# Patient Record
Sex: MALE | Race: BLACK OR AFRICAN AMERICAN | HISPANIC OR LATINO
[De-identification: names, ages, dates, MRNs, and addresses within clinical notes are randomized per-mention and may not be internally consistent; named-entity substitution may affect disease eponyms.]

---

## 2021-02-18 PROBLEM — Z00.00 ENCOUNTER FOR PREVENTIVE HEALTH EXAMINATION: Status: ACTIVE | Noted: 2021-02-18

## 2021-02-19 ENCOUNTER — APPOINTMENT (OUTPATIENT)
Dept: OTOLARYNGOLOGY | Facility: CLINIC | Age: 21
End: 2021-02-19
Payer: COMMERCIAL

## 2021-02-19 VITALS
TEMPERATURE: 76 F | HEART RATE: 70 BPM | BODY MASS INDEX: 30.8 KG/M2 | SYSTOLIC BLOOD PRESSURE: 134 MMHG | DIASTOLIC BLOOD PRESSURE: 77 MMHG | HEIGHT: 74 IN | WEIGHT: 240 LBS

## 2021-02-19 DIAGNOSIS — L08.9 LOCAL INFECTION OF THE SKIN AND SUBCUTANEOUS TISSUE, UNSPECIFIED: ICD-10-CM

## 2021-02-19 DIAGNOSIS — Z78.9 OTHER SPECIFIED HEALTH STATUS: ICD-10-CM

## 2021-02-19 DIAGNOSIS — Z87.2 PERSONAL HISTORY OF DISEASES OF THE SKIN AND SUBCUTANEOUS TISSUE: ICD-10-CM

## 2021-02-19 DIAGNOSIS — H60.8X3 OTHER OTITIS EXTERNA, BILATERAL: ICD-10-CM

## 2021-02-19 DIAGNOSIS — Z87.891 PERSONAL HISTORY OF NICOTINE DEPENDENCE: ICD-10-CM

## 2021-02-19 DIAGNOSIS — Z87.09 PERSONAL HISTORY OF OTHER DISEASES OF THE RESPIRATORY SYSTEM: ICD-10-CM

## 2021-02-19 DIAGNOSIS — F45.8 OTHER SOMATOFORM DISORDERS: ICD-10-CM

## 2021-02-19 DIAGNOSIS — H61.23 IMPACTED CERUMEN, BILATERAL: ICD-10-CM

## 2021-02-19 DIAGNOSIS — Z91.89 OTHER SPECIFIED PERSONAL RISK FACTORS, NOT ELSEWHERE CLASSIFIED: ICD-10-CM

## 2021-02-19 DIAGNOSIS — Z91.010 ALLERGY TO PEANUTS: ICD-10-CM

## 2021-02-19 PROCEDURE — 92557 COMPREHENSIVE HEARING TEST: CPT

## 2021-02-19 PROCEDURE — 92550 TYMPANOMETRY & REFLEX THRESH: CPT

## 2021-02-19 PROCEDURE — 99072 ADDL SUPL MATRL&STAF TM PHE: CPT

## 2021-02-19 PROCEDURE — 99204 OFFICE O/P NEW MOD 45 MIN: CPT

## 2021-02-19 RX ORDER — MOMETASONE FUROATE 1 MG/ML
0.1 SOLUTION TOPICAL
Qty: 1 | Refills: 1 | Status: ACTIVE | COMMUNITY
Start: 2021-02-19 | End: 1900-01-01

## 2021-02-19 RX ORDER — ALBUTEROL 90 MCG
90 AEROSOL (GRAM) INHALATION
Refills: 0 | Status: ACTIVE | COMMUNITY

## 2021-02-26 PROBLEM — Z91.010 HISTORY OF PEANUT ALLERGY: Status: RESOLVED | Noted: 2021-02-26 | Resolved: 2021-02-26

## 2021-02-26 PROBLEM — Z87.891 FORMER SMOKER: Status: ACTIVE | Noted: 2021-02-19

## 2021-02-26 PROBLEM — Z87.2 HISTORY OF ECZEMA: Status: RESOLVED | Noted: 2021-02-26 | Resolved: 2021-02-26

## 2021-02-26 PROBLEM — L08.9 TOE INFECTION: Status: ACTIVE | Noted: 2021-02-26

## 2021-02-26 PROBLEM — F45.8 BRUXISM: Status: ACTIVE | Noted: 2021-02-26

## 2021-02-26 NOTE — HISTORY OF PRESENT ILLNESS
[de-identified] : Mr. Hicks is a 20 year old man who presents for bilateral ear irritation.\par PCP is Shannon Tinsley MD.\par \par For over 1 year he feels like something is in both ears. Left ear is worse than right. \par Ear pain comes and goes, some days worse than others. Left ear gets more pain, then gets dry and he removes dead skin.\par Ears feel clogged with wax.\par A few weeks ago he had a pimple in his ear and tried to pop it --> it started bleeding.\par Feels like hearing is decreased in the left ear.\par Ringing in both ears for the past year.\par Wears headphones and listens to loud music.\par Previously did construction.\par Used to grind his teeth. He has heard his jaw click. Chews gum.\par Getting a root canal on Monday, 2/22.\par \par On antibiotics for his toe, after he dropped a keg on it a year ago.\par Smoke cigarettes since from 13 y/o until 7 months ago. 6-10 cigs a day. Marijuana use every day.\par History of asthma.  Gets eczema occasionally.\par

## 2021-02-26 NOTE — PHYSICAL EXAM
[Midline] : trachea located in midline position [Normal] : palatine tonsils are normal [] : septum deviated bilaterally [FreeTextEntry1] : No hoarseness. [de-identified] : TMJ click bilateral , nontender. [de-identified] : BILATERAL EACs: scant cerumen with lot of dry flaky skin was removed with curette.  Left EAC meatus with healing furuncle inferior wall. (residual dot erythema). [de-identified] : Occlusal surfaces molar teeth slightly flatteneded. [de-identified] : 2+ bilateral  [de-identified] : Carotid pulses 2+ bilateral.

## 2021-02-26 NOTE — DATA REVIEWED
[de-identified] : \par AUDIOGRAM (02/19/2021)\par RIGHT:  Hearing within normal limits.  \par LEFT:   Hearing within normal limits. \par Slight asymmetry at 8K Hz, worse on left side.  \par Tympanograms  A  bilateral   \par Word recognition 100%  bilateral    \par

## 2021-02-26 NOTE — CONSULT LETTER
[Dear  ___] : Dear  [unfilled], [Courtesy Letter:] : I had the pleasure of seeing your patient, [unfilled], in my office today. [Please see my note below.] : Please see my note below. [Sincerely,] : Sincerely, [FreeTextEntry2] : Shannon Tinsley MD\par [address]\par [place] [id number]\par  [FreeTextEntry3] : \par Prisca Sheppard MD \par Otolaryngology, Head and Neck Surgery \par \par

## 2021-02-26 NOTE — ASSESSMENT
[FreeTextEntry1] : Mr. Rinaldi evaluated for the following issues today:\par \par 1.) Bilateral ear irritation due to dry skin and chronic eczematous otitis externa.\par --> steroid cream to ear canal skin PRN itching\par \par 2.) Left ear pain seems more c/w TMJ arthralgia.  He has known hx of bruxism and chews gum often.\par -->  recommended.  To see dentist next week for root canal and discuss at that time\par --> TMJ precautions, like stop gum chewing.\par \par 3.) Hearing difficulty\par Audiogram today shows hearing is within normal limits except for mild asymmetricy at 8K Hz, worse on left side.\par \par 4.) Healed furuncle in left EAC meatus.\par \par Return 3 months to recheck hearing.\par \par

## 2021-05-19 ENCOUNTER — APPOINTMENT (OUTPATIENT)
Dept: OTOLARYNGOLOGY | Facility: CLINIC | Age: 21
End: 2021-05-19
Payer: COMMERCIAL

## 2021-05-19 VITALS
SYSTOLIC BLOOD PRESSURE: 129 MMHG | WEIGHT: 240 LBS | DIASTOLIC BLOOD PRESSURE: 72 MMHG | TEMPERATURE: 96.7 F | HEART RATE: 89 BPM | BODY MASS INDEX: 30.8 KG/M2 | HEIGHT: 74 IN

## 2021-05-19 DIAGNOSIS — M26.609 UNSPECIFIED TEMPOROMANDIBULAR JOINT DISORDER: ICD-10-CM

## 2021-05-19 DIAGNOSIS — H92.02 OTALGIA, LEFT EAR: ICD-10-CM

## 2021-05-19 DIAGNOSIS — H93.8X3 OTHER SPECIFIED DISORDERS OF EAR, BILATERAL: ICD-10-CM

## 2021-05-19 DIAGNOSIS — H60.00 ABSCESS OF EXTERNAL EAR, UNSPECIFIED EAR: ICD-10-CM

## 2021-05-19 DIAGNOSIS — H91.90 UNSPECIFIED HEARING LOSS, UNSPECIFIED EAR: ICD-10-CM

## 2021-05-19 PROCEDURE — 92550 TYMPANOMETRY & REFLEX THRESH: CPT

## 2021-05-19 PROCEDURE — 99213 OFFICE O/P EST LOW 20 MIN: CPT

## 2021-05-19 PROCEDURE — 92557 COMPREHENSIVE HEARING TEST: CPT

## 2021-05-19 RX ORDER — MOMETASONE FUROATE 1 MG/ML
0.1 SOLUTION TOPICAL
Qty: 1 | Refills: 2 | Status: ACTIVE | COMMUNITY
Start: 2021-05-19 | End: 1900-01-01

## 2021-06-07 PROBLEM — H92.02 LEFT EAR PAIN: Status: ACTIVE | Noted: 2021-02-19

## 2021-06-07 PROBLEM — H60.00 FURUNCLE OF EAR: Status: RESOLVED | Noted: 2021-02-19 | Resolved: 2021-06-07

## 2021-06-07 PROBLEM — M26.609 TMJ (TEMPOROMANDIBULAR JOINT DISORDER): Status: ACTIVE | Noted: 2021-02-19

## 2021-06-07 PROBLEM — H93.8X3 SENSATION OF PLUGGED EAR ON BOTH SIDES: Status: RESOLVED | Noted: 2021-02-19 | Resolved: 2021-06-07

## 2021-06-07 PROBLEM — H91.90 DIFFICULTY HEARING: Status: ACTIVE | Noted: 2021-02-26

## 2021-06-07 NOTE — PHYSICAL EXAM
[de-identified] : TMJ click bilateral , nontender. [Midline] : trachea located in midline position [de-identified] : teeth slightly flat [de-identified] : 2+ [Normal] : no neck adenopathy

## 2021-06-07 NOTE — ASSESSMENT
[FreeTextEntry1] : Mr. Rinaldi evaluated for the following issues today:\par \par 1.) Audiogram was repeated today shows hearing is within normal limits and symmetric bilaterally.\par I reassured him that hearing is within normal limits.  If continues to perceive a problem, then he may benefit from assessment of central auditory processing (CAP) testing.\par \par 2.) Bilateral chronic eczematous otitis externa controlled with PRN steroid cream to skin\par \par 3.) Left ear pain due to TMJ arthralgia.  Hx of bruxism\par -->  still recommended.  discuss with dentist at next visit\par --> continue TMJ precautions\par \par I would be happy to see him again as needed. \par \par

## 2021-06-07 NOTE — CONSULT LETTER
[Dear  ___] : Dear  [unfilled], [Courtesy Letter:] : I had the pleasure of seeing your patient, [unfilled], in my office today. [Please see my note below.] : Please see my note below. [Sincerely,] : Sincerely, [___] : [unfilled] [FreeTextEntry2] : Shannon Tinsley MD\par [address]\par [place] [id number]\par  [FreeTextEntry3] : \par Prisca Sheppard MD \par Otolaryngology, Head and Neck Surgery \par \par

## 2021-06-07 NOTE — DATA REVIEWED
[de-identified] : \par AUDIOGRAM (05/19/2021)\par RIGHT:  Hearing within normal limits.  \par LEFT:   Hearing within normal limits. \par No asymmetry noted today.\par Tympanograms  A  bilateral   \par Word recognition 100%  bilateral    \par \par AUDIOGRAM (02/19/2021)\par RIGHT:  Hearing within normal limits.  \par LEFT:   Hearing within normal limits. \par Slight asymmetry at 8K Hz, worse on left side.  \par Tympanograms  A  bilateral   \par Word recognition 100%  bilateral    \par

## 2021-06-07 NOTE — HISTORY OF PRESENT ILLNESS
[de-identified] : Mr. Hicks is seen in f/up for hearing evaluation\par PCP is Shannon Tinsley MD.\par \par On audiogram from Feb 2021, hearing was within normal limits except for mild asymmetry at 8K Hz, worse on left.\par No ear itching\par Still gets left ear pain at times.  Diagnosed last visit w/ TMJ arthralgia.  Saw dentist but no nightguard made.\par \par \par INITIAL VISIT 02/19/2021:\par For over 1 year he feels like something is in both ears. Left ear is worse than right. \par Ear pain comes and goes, some days worse than others. Left ear gets more pain, then gets dry and he removes dead skin.  Ears feel clogged with wax.  Feels like hearing is decreased in the left ear.\par A few weeks ago he had a pimple in his ear and tried to pop it --> it started bleeding.\par Ringing in both ears for the past year.  Wears headphones and listens to loud music.  Previously did construction.\par Used to grind his teeth. He has heard his jaw click. Chews gum.  Getting a root canal on Monday, 2/22.\par On antibiotics for his toe, after he dropped a keg on it a year ago.\par Smoke cigarettes since from 13 y/o until 7 months ago. 6-10 cigs a day. Marijuana use every day.\par History of asthma.  Gets eczema occasionally.\par

## 2023-12-19 ENCOUNTER — APPOINTMENT (OUTPATIENT)
Dept: ORTHOPEDIC SURGERY | Facility: CLINIC | Age: 23
End: 2023-12-19

## 2024-05-16 ENCOUNTER — EMERGENCY (EMERGENCY)
Facility: HOSPITAL | Age: 24
LOS: 1 days | Discharge: HOME | End: 2024-05-16
Attending: EMERGENCY MEDICINE | Admitting: EMERGENCY MEDICINE
Payer: COMMERCIAL

## 2024-05-16 VITALS
SYSTOLIC BLOOD PRESSURE: 111 MMHG | OXYGEN SATURATION: 98 % | HEART RATE: 57 BPM | DIASTOLIC BLOOD PRESSURE: 61 MMHG | RESPIRATION RATE: 17 BRPM | TEMPERATURE: 98 F

## 2024-05-16 VITALS
RESPIRATION RATE: 18 BRPM | HEART RATE: 66 BPM | WEIGHT: 214.95 LBS | DIASTOLIC BLOOD PRESSURE: 74 MMHG | SYSTOLIC BLOOD PRESSURE: 128 MMHG | TEMPERATURE: 98 F | OXYGEN SATURATION: 99 %

## 2024-05-16 DIAGNOSIS — M54.50 LOW BACK PAIN, UNSPECIFIED: ICD-10-CM

## 2024-05-16 DIAGNOSIS — N44.2 BENIGN CYST OF TESTIS: ICD-10-CM

## 2024-05-16 DIAGNOSIS — N50.89 OTHER SPECIFIED DISORDERS OF THE MALE GENITAL ORGANS: ICD-10-CM

## 2024-05-16 DIAGNOSIS — N50.811 RIGHT TESTICULAR PAIN: ICD-10-CM

## 2024-05-16 DIAGNOSIS — I86.1 SCROTAL VARICES: ICD-10-CM

## 2024-05-16 DIAGNOSIS — Z86.19 PERSONAL HISTORY OF OTHER INFECTIOUS AND PARASITIC DISEASES: ICD-10-CM

## 2024-05-16 DIAGNOSIS — Z91.018 ALLERGY TO OTHER FOODS: ICD-10-CM

## 2024-05-16 LAB
APPEARANCE UR: CLEAR — SIGNIFICANT CHANGE UP
BILIRUB UR-MCNC: NEGATIVE — SIGNIFICANT CHANGE UP
COLOR SPEC: YELLOW — SIGNIFICANT CHANGE UP
DIFF PNL FLD: NEGATIVE — SIGNIFICANT CHANGE UP
GLUCOSE UR QL: NEGATIVE MG/DL — SIGNIFICANT CHANGE UP
HIV 1+2 AB+HIV1 P24 AG SERPL QL IA: SIGNIFICANT CHANGE UP
KETONES UR-MCNC: ABNORMAL MG/DL
LEUKOCYTE ESTERASE UR-ACNC: NEGATIVE — SIGNIFICANT CHANGE UP
NITRITE UR-MCNC: NEGATIVE — SIGNIFICANT CHANGE UP
PH UR: 5.5 — SIGNIFICANT CHANGE UP (ref 5–8)
PROT UR-MCNC: SIGNIFICANT CHANGE UP MG/DL
SP GR SPEC: 1.03 — SIGNIFICANT CHANGE UP (ref 1–1.03)
UROBILINOGEN FLD QL: 1 MG/DL — SIGNIFICANT CHANGE UP (ref 0.2–1)

## 2024-05-16 PROCEDURE — 96372 THER/PROPH/DIAG INJ SC/IM: CPT

## 2024-05-16 PROCEDURE — 76870 US EXAM SCROTUM: CPT

## 2024-05-16 PROCEDURE — 81003 URINALYSIS AUTO W/O SCOPE: CPT

## 2024-05-16 PROCEDURE — 76870 US EXAM SCROTUM: CPT | Mod: 26

## 2024-05-16 PROCEDURE — 87389 HIV-1 AG W/HIV-1&-2 AB AG IA: CPT

## 2024-05-16 PROCEDURE — 99284 EMERGENCY DEPT VISIT MOD MDM: CPT

## 2024-05-16 PROCEDURE — 36415 COLL VENOUS BLD VENIPUNCTURE: CPT

## 2024-05-16 PROCEDURE — 99284 EMERGENCY DEPT VISIT MOD MDM: CPT | Mod: 25

## 2024-05-16 RX ORDER — KETOROLAC TROMETHAMINE 30 MG/ML
30 SYRINGE (ML) INJECTION ONCE
Refills: 0 | Status: DISCONTINUED | OUTPATIENT
Start: 2024-05-16 | End: 2024-05-16

## 2024-05-16 RX ADMIN — Medication 30 MILLIGRAM(S): at 14:47

## 2024-05-16 NOTE — ED PROVIDER NOTE - PATIENT PORTAL LINK FT
You can access the FollowMyHealth Patient Portal offered by [facility] by registering at the following website: [email or web address]. By joining [facility]’s FollowMyHealth portal, you will also be able to view your health information using other applications (apps) compatible with our system.

## 2024-05-16 NOTE — ED PROVIDER NOTE - CLINICAL SUMMARY MEDICAL DECISION MAKING FREE TEXT BOX
23M denies PMH p/w pain. Has pain to scrotal/b/l testicular area. Intermittent for several months, more constant x1 month. States he went to another ER (cant recall which) ~1 month ago and had STD testing (reportedly wnl) and CT a/p (reportedly wnl). Came to ED today for persistent symptoms. On ROS pt also notes b/l lower back pain, for several years. No other systemic symptoms.   Vitals wnl, exam as above.   UA w/ ketone, no infection.   ddx: Unclear etiology. Clinically no significant hernia, torsion, epididymitis. Possible prostate issues.   Pt concerned about testicular pathology, explained limitations of US, will check US while in ED.

## 2024-05-16 NOTE — ED PROVIDER NOTE - NSFOLLOWUPINSTRUCTIONS_ED_ALL_ED_FT
It is unclear what exactly is causing your symptoms! It is important to continue following up with your doctor outside the hospital and to return to ER for: Persistent fever/vomiting, uncontrolled pain, worsening swelling, worsening breathing, worsening lightheaded, spreading redness.     Can take tylenol every 6hrs as needed for pain.    Can also take motrin 600mg every 6hrs as needed for pain (WARNING: Use may cause stomach issues/problems. Take with food. Prolonged use can also cause kidney issues.).     Stay well hydrated.    Return for fevers, spreading redness, worsening swelling, persistent vomit, uncontrolled pain, worsening breathing, worsening lightheaded, unable to urinate.    Follow up with primary doctor within 1-2 days.     Follow up with urologist as soon as possible. Can call [phone] to schedule appointment. Can also follow up at [facility] urology clinic. Can call [phone] to schedule appointment.

## 2024-05-16 NOTE — ED PROVIDER NOTE - PHYSICAL EXAMINATION
KAYLA bedolla chaperone: no ulcers/sores, no testicular enlargement/ttp. no inguinal hernias. No crepitus, firmness, induration, fluctuance. Skin is normal temp. No erythema/warmth. No obvious skin breaks.     no LE edema, normal equal distal pulses, steady unassisted gait.

## 2024-05-16 NOTE — ED PROVIDER NOTE - PROGRESS NOTE DETAILS
[place]: US showed "Bilateral testicular microlithiasis. Small bilateral intratesticular cysts. Small left varicocele."   Discussed all results with patient, including any incidental radiological findings. Given copy of results and instructed to bring copy to primary doctor.   Discussed importance of outpt follow up and return precautions. Clinically no indication for further emergent ED workup or hospitalization at this time. Stable for dc, outpt f/u.

## 2024-05-16 NOTE — ED ADULT NURSE NOTE - CAS EDN DISCHARGE ASSESSMENT
Alert and oriented to person, place and time/Patient baseline mental status/Awake/Symptoms improved/Dressing clean and dry Alert and oriented to person, place and time/Patient baseline mental status/Awake/Symptoms improved

## 2024-05-16 NOTE — ED ADULT NURSE NOTE - OBJECTIVE STATEMENT
Pt is a 24y/o M presenting to the ED w/ c/o of bilat/lower back pain xmultiple mo, now radiates to groin/"herrera" area when I pee, "I cannot explain it hurts and feels like something is pulling when I pee" x1week. Pt endorses PMHx scoliosis. Pt denies numbness/tingling to BLE, weakness, diff walking, loss of bowel/bladder control, fever/chills, abd pain, N/V/D, CP, SOB, urinary symptoms (burning w/ urination, discharge). 4/4 strength to BLE, + sensation, + pedal pulses. Pt A/Ox3, speaking in clear/complete sentences. Respirations easy/even and unlabored on RA. Pt ambulates independently w/ steady gait.

## 2024-05-16 NOTE — ED PROVIDER NOTE - OBJECTIVE STATEMENT
23M denies PMH p/w pain. Has pain to scrotal/b/l testicular area. Intermittent for several months, more constant x1 month. States he went to another ER (cant recall which) ~1 month ago and had STD testing (reportedly wnl) and CT a/p (reportedly wnl). Came to ED today for persistent symptoms. On ROS pt also notes b/l lower back pain, for several years. No other systemic symptoms.   Sexually active w/ several partners, doesn't always use protection, hx chlamydia >5yrs ago, treated.   Denies penile discharge, ulcers, rashes, joint pains, f/c, abd pain, SOB/CP, URI symptoms, NVD, urinary complaints, focal weakness/numbness, incontinence.

## 2024-05-16 NOTE — ED ADULT TRIAGE NOTE - CHIEF COMPLAINT QUOTE
Patient no PMH to the ED c/o lower back radiating to bilateral groin pain x 1.5 weeks, denies urinary changes. AAOx4, NAD.

## 2025-06-11 ENCOUNTER — EMERGENCY (EMERGENCY)
Facility: HOSPITAL | Age: 25
LOS: 1 days | End: 2025-06-11
Attending: STUDENT IN AN ORGANIZED HEALTH CARE EDUCATION/TRAINING PROGRAM | Admitting: STUDENT IN AN ORGANIZED HEALTH CARE EDUCATION/TRAINING PROGRAM
Payer: COMMERCIAL

## 2025-06-11 VITALS
DIASTOLIC BLOOD PRESSURE: 68 MMHG | SYSTOLIC BLOOD PRESSURE: 142 MMHG | OXYGEN SATURATION: 96 % | TEMPERATURE: 99 F | WEIGHT: 210.1 LBS | HEART RATE: 97 BPM | RESPIRATION RATE: 18 BRPM

## 2025-06-11 VITALS
HEART RATE: 83 BPM | SYSTOLIC BLOOD PRESSURE: 120 MMHG | OXYGEN SATURATION: 98 % | TEMPERATURE: 99 F | RESPIRATION RATE: 18 BRPM | DIASTOLIC BLOOD PRESSURE: 73 MMHG

## 2025-06-11 DIAGNOSIS — S01.112A LACERATION WITHOUT FOREIGN BODY OF LEFT EYELID AND PERIOCULAR AREA, INITIAL ENCOUNTER: ICD-10-CM

## 2025-06-11 DIAGNOSIS — R51.9 HEADACHE, UNSPECIFIED: ICD-10-CM
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
## 2025-06-11 DIAGNOSIS — Z91.018 ALLERGY TO OTHER FOODS: ICD-10-CM
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
## 2025-06-11 DIAGNOSIS — Z23 ENCOUNTER FOR IMMUNIZATION: ICD-10-CM

## 2025-06-11 PROCEDURE — 99284 EMERGENCY DEPT VISIT MOD MDM: CPT

## 2025-06-11 PROCEDURE — 70450 CT HEAD/BRAIN W/O DYE: CPT

## 2025-06-11 PROCEDURE — 12011 RPR F/E/E/N/L/M 2.5 CM/<: CPT

## 2025-06-11 PROCEDURE — 90471 IMMUNIZATION ADMIN: CPT

## 2025-06-11 PROCEDURE — 70486 CT MAXILLOFACIAL W/O DYE: CPT

## 2025-06-11 PROCEDURE — 70450 CT HEAD/BRAIN W/O DYE: CPT | Mod: 26

## 2025-06-11 PROCEDURE — 90715 TDAP VACCINE 7 YRS/> IM: CPT

## 2025-06-11 PROCEDURE — 70486 CT MAXILLOFACIAL W/O DYE: CPT | Mod: 26

## 2025-06-11 PROCEDURE — 99284 EMERGENCY DEPT VISIT MOD MDM: CPT | Mod: 25

## 2025-06-11 RX ORDER — ACETAMINOPHEN 500 MG/5ML
975 LIQUID (ML) ORAL ONCE
Refills: 0 | Status: COMPLETED | OUTPATIENT
Start: 2025-06-11 | End: 2025-06-11

## 2025-06-11 RX ADMIN — Medication 975 MILLIGRAM(S): at 20:47

## 2025-06-19 ENCOUNTER — EMERGENCY (EMERGENCY)
Facility: HOSPITAL | Age: 25
LOS: 1 days | End: 2025-06-19
Admitting: STUDENT IN AN ORGANIZED HEALTH CARE EDUCATION/TRAINING PROGRAM
Payer: MEDICAID

## 2025-06-19 VITALS
HEIGHT: 74 IN | WEIGHT: 210.1 LBS | TEMPERATURE: 99 F | HEART RATE: 67 BPM | RESPIRATION RATE: 17 BRPM | SYSTOLIC BLOOD PRESSURE: 124 MMHG | OXYGEN SATURATION: 98 % | DIASTOLIC BLOOD PRESSURE: 77 MMHG

## 2025-06-19 DIAGNOSIS — S01.112D LACERATION WITHOUT FOREIGN BODY OF LEFT EYELID AND PERIOCULAR AREA, SUBSEQUENT ENCOUNTER: ICD-10-CM

## 2025-06-19 DIAGNOSIS — Z91.018 ALLERGY TO OTHER FOODS: ICD-10-CM

## 2025-06-19 DIAGNOSIS — H92.02 OTALGIA, LEFT EAR: ICD-10-CM

## 2025-06-19 PROCEDURE — L9995: CPT

## 2025-06-19 PROCEDURE — 99212 OFFICE O/P EST SF 10 MIN: CPT
